# Patient Record
Sex: FEMALE
[De-identification: names, ages, dates, MRNs, and addresses within clinical notes are randomized per-mention and may not be internally consistent; named-entity substitution may affect disease eponyms.]

---

## 2023-06-09 ENCOUNTER — NURSE TRIAGE (OUTPATIENT)
Dept: OTHER | Facility: CLINIC | Age: 63
End: 2023-06-09

## 2023-06-09 NOTE — TELEPHONE ENCOUNTER
Location of patient: [place]    Received call from [place] at [facility] with Red Flag Complaint. Adolfo Smith MRN: [id number]    Provider: Gustavo Valle MD     Subjective: Caller states \"I have a UTI and I need an antibiotic. I am only going a few drops. \"     Current Symptoms: frequent urination, painful urination, retention     Associated Symptoms: NA    Pain Severity: 0/10; uncomfortable; intermittent    Temperature: caller denies by unknown method    What has been tried: n/a    Recommended disposition: Go to ED Now    Care advice provided, patient verbalizes understanding; denies any other questions or concerns.     Outcome: Patient going [facility]     No Appointments available, patient referred to Emergency Department      This triage is a result of a call to the [facility] 258            Reason for Disposition   Unable to urinate (or only a few drops) > 4 hours and bladder feels very full (e.g., palpable bladder or strong urge to urinate)    Protocols used: Urinary Symptoms-ADULT-[place]